# Patient Record
Sex: MALE | Race: OTHER | HISPANIC OR LATINO | URBAN - METROPOLITAN AREA
[De-identification: names, ages, dates, MRNs, and addresses within clinical notes are randomized per-mention and may not be internally consistent; named-entity substitution may affect disease eponyms.]

---
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
## 2019-07-02 NOTE — ED PROVIDER NOTE - NS PRO PASSIVE SMOKE EXP
pt c/o R flank pain that travels to RUQ associated with n/v/d x yesterday.  per pt, was dx with perforated appendicitis but no surgery at this time. No

## 2023-08-23 NOTE — ED PROVIDER NOTE - ENMT, MLM
76
Airway patent, Nasal mucosa clear. Mouth with normal mucosa. Throat has no vesicles, no oropharyngeal exudates and uvula is midline.

## 2025-03-21 NOTE — ED PROVIDER NOTE - EXITCARE/DISCHARGE INSTRUCTIONS
Otc Regimen: Zyrtec daily Detail Level: Zone Launch Exitcare and print the 'Prescriptions from this Visit' Report

## 2025-06-23 NOTE — ED PROVIDER NOTE - OBJECTIVE STATEMENT
Appt 6/25/25,   15 y/o M w/ no significant PMHx BIB father to the ED c/o epigastric abd pain radiating up to chest x1 month. Pt states pain worsens after eating. Pt denies fever, chills or any other complaints. NKDA. 17 y/o M w/ no significant PMHx BIB father to the ED c/o epigastric abd pain radiating up to chest x1 month. Pt states pain worsens after eating. Pt denies fever, chills or any other complaints. It is a strong burning pain. He hasn't taken anything for it. Food makes it worse, no associated n/v/diaphoresis, no sob. Nothing else is bothering him. NKDA, he didn't see anyone for this. No black stool, no brbpr